# Patient Record
Sex: MALE | Race: WHITE | Employment: FULL TIME | RURAL
[De-identification: names, ages, dates, MRNs, and addresses within clinical notes are randomized per-mention and may not be internally consistent; named-entity substitution may affect disease eponyms.]

---

## 2022-11-21 ENCOUNTER — APPOINTMENT (OUTPATIENT)
Dept: GENERAL RADIOLOGY | Age: 52
End: 2022-11-21
Attending: FAMILY MEDICINE
Payer: COMMERCIAL

## 2022-11-21 ENCOUNTER — HOSPITAL ENCOUNTER (EMERGENCY)
Age: 52
Discharge: HOME | End: 2022-11-21
Attending: FAMILY MEDICINE
Payer: COMMERCIAL

## 2022-11-21 VITALS
OXYGEN SATURATION: 98 % | RESPIRATION RATE: 18 BRPM | HEIGHT: 72 IN | SYSTOLIC BLOOD PRESSURE: 128 MMHG | WEIGHT: 220 LBS | TEMPERATURE: 97.6 F | DIASTOLIC BLOOD PRESSURE: 67 MMHG | BODY MASS INDEX: 29.8 KG/M2 | HEART RATE: 70 BPM

## 2022-11-21 DIAGNOSIS — S83.402A SPRAIN OF COLLATERAL LIGAMENT OF LEFT KNEE, INITIAL ENCOUNTER: Primary | ICD-10-CM

## 2022-11-21 DIAGNOSIS — T14.8XXA ABRASION: ICD-10-CM

## 2022-11-21 PROCEDURE — L1830 KO IMMOB CANVAS LONG PRE OTS: HCPCS

## 2022-11-21 PROCEDURE — 73562 X-RAY EXAM OF KNEE 3: CPT

## 2022-11-21 PROCEDURE — 99283 EMERGENCY DEPT VISIT LOW MDM: CPT

## 2022-11-21 NOTE — Clinical Note
[address] [place] [address] EMERGENCY DEP  [address] [facility] [address] [place] [id number]  [phone]    Work/School Note    Date: 11/21/2022    To Whom It May concern:    Eulalia Kaur was seen and treated today in the emergency room by the following provider(s):  Attending Provider: Traci Calderon MD.      Eulalia Kaur is excused from work/school on 11/21/2022 through 11/23/2022. He is medically clear to return to work/school on 11/24/2022.          Sincerely,          Munir Haynes MD

## 2022-11-22 NOTE — DISCHARGE INSTRUCTIONS
--Knee brace when you are walking or standing up. Crutches as needed. --Ibuprofen 800 mg every 8 hours as needed for pain. --Ice for 20 minutes every hour while awake. --Wash the cut with soap and water, dress with Neosporin and bandage twice daily.

## 2022-11-22 NOTE — ED TRIAGE NOTES
Pt reports that he tripped and fell this morning around 0900 - c/o left knee pain that is worse with standing and ambulating. Walks with cane. No LOC/did not hit head.

## 2022-11-22 NOTE — ED PROVIDER NOTES
EMERGENCY DEPARTMENT HISTORY AND PHYSICAL EXAM          Date: 11/21/2022  Patient Name: Renato Jeronimo    History of Presenting Illness     Chief Complaint   Patient presents with    Knee Injury       History Provided By: Patient    HPI: Renato Jeronimo is a 46 y.o. male, pmhx , who was brought to the ED by his wife after he fell and injured his left knee. He was carrying a table about 10 hours ago when he fell on tile floor in his home, landing on his left knee. He is not certain if he fell straight onto his patella or if he twisted his knee as he fell. He had a large hematoma and a superficial lac to his patella that he noticed soon after he fell. He used ice and ibuprofen through the day, but the pain increased through the day. He is able to bear weight with the aid of a cane that belonged to his father. No previous injuries to his knee, and no other injury when he fell. The worst pain is just lateral to the patella, as well as on the medial aspect of the patella itself. PCP: Ally Guerrero, Not On File, MD    Allergies: NKDA  Social Hx: 1/2 ppd tobacco, -vaping, +EtOH, -Illicit Drugs; Lives locally c wife. There are no other complaints, changes, or physical findings at this time. Past History     Past Medical History:  No past medical history on file. Past Surgical History:  No past surgical history on file. Family History:  No family history on file. Social History:  Social History     Tobacco Use    Smoking status: Every Day     Packs/day: 0.50     Years: 30.00     Pack years: 15.00     Types: Cigarettes   Vaping Use    Vaping Use: Never used   Substance Use Topics    Alcohol use: Yes     Alcohol/week: 6.0 standard drinks     Types: 6 Cans of beer per week    Drug use: Yes     Types: Marijuana     Comment: occas       Allergies:  No Known Allergies      Review of Systems   Review of Systems   Musculoskeletal:  Positive for joint swelling. Negative for neck pain. Skin:  Positive for wound. Neurological:  Negative for weakness and numbness. Physical Exam   Physical Exam  Constitutional:       Appearance: Normal appearance. HENT:      Head: Normocephalic. Right Ear: External ear normal.      Left Ear: External ear normal.      Nose: Nose normal.      Mouth/Throat:      Mouth: Mucous membranes are moist.   Eyes:      Extraocular Movements: Extraocular movements intact. Pupils: Pupils are equal, round, and reactive to light. Cardiovascular:      Rate and Rhythm: Normal rate. Pulmonary:      Effort: Pulmonary effort is normal.   Musculoskeletal:      Left knee: Swelling, effusion, erythema, laceration and bony tenderness present. No deformity or ecchymosis. Decreased range of motion. Tenderness present over the lateral joint line. Normal alignment and normal patellar mobility. Legs:       Comments: --Swelling, tenderness over the left patella and lateral to the patella, including the lateral joint space. --Slightly decreased AROM.  --Superficial laceration on central patella, approx 1.5 cm in the horizontal direction. --Thigh, leg without tenderness or swelling. Skin:     General: Skin is warm and dry. Neurological:      Mental Status: He is alert and oriented to person, place, and time. Diagnostic Study Results       Radiologic Studies -   XR KNEE LT 3 V   Final Result   No acute fracture. There is a small joint effusion. Medical Decision Making   I am the first provider for this patient. I reviewed the vital signs, available nursing notes, past medical history, past surgical history, family history and social history. Vital Signs-Reviewed the patient's vital signs.   Patient Vitals for the past 12 hrs:   Temp Pulse Resp BP SpO2   11/21/22 1950 -- -- -- -- 98 %   11/21/22 1945 97.6 °F (36.4 °C) 70 18 128/67 98 %       Pulse Oximetry Analysis - 98% on RA      Records Reviewed: Nursing Notes and Old Medical Records    Provider Notes (Medical Decision Making):   MDM:  Pt with a sprain/contusion/abrasion to his knee. Advised to use crutches and knee brace while up and around. Could be a lateral collateral tear, but more likely a contusion to the patella and the left tibial plateau. ED Course:   Initial assessment performed. The patients presenting problems have been discussed, and they are in agreement with the care plan formulated and outlined with them. I have encouraged them to ask questions as they arise throughout their visit. PROGRESS NOTE:  Pt fitted with a knee immobilizer, and the wound cleaned and dressed. He reported that he had crutches at home. His work is on his feet all day, so he was given a note for 3 days. He was referred to Dr. Ann Rayo for follow up. Discharge note:  Pt re-evaluated and noted to be feeling better, ready for discharge. Updated pt on all final xray findings. Will follow up as instructed . All questions have been answered, pt voiced understanding and agreement with plan. Specific return precautions provided as well as instructions to return to the ED should sx worsen at any time. Vital signs stable for discharge. Critical Care Time: 0      Diagnosis     Clinical Impression:   1. Sprain of collateral ligament of left knee, initial encounter    2. Abrasion        PLAN:  1. There are no discharge medications for this patient.     2.   Follow-up Information       Follow up With Specialties Details Why Contact Info    Velia Paredes MD Orthopedic Surgery In 3 days  [address]  [address] [facility]  [phone]            Return to ED if worse     Disposition:  Home

## 2025-06-26 ENCOUNTER — TRANSCRIBE ORDERS (OUTPATIENT)
Facility: HOSPITAL | Age: 55
End: 2025-06-26

## 2025-06-26 DIAGNOSIS — M54.41 CHRONIC BILATERAL LOW BACK PAIN WITH RIGHT-SIDED SCIATICA: Primary | ICD-10-CM

## 2025-06-26 DIAGNOSIS — M51.26 HNP (HERNIATED NUCLEUS PULPOSUS), LUMBAR: ICD-10-CM

## 2025-06-26 DIAGNOSIS — G89.29 CHRONIC BILATERAL LOW BACK PAIN WITH RIGHT-SIDED SCIATICA: Primary | ICD-10-CM

## 2025-07-09 ENCOUNTER — HOSPITAL ENCOUNTER (OUTPATIENT)
Facility: HOSPITAL | Age: 55
Discharge: HOME | End: 2025-07-12
Payer: COMMERCIAL

## 2025-07-09 DIAGNOSIS — M51.26 HNP (HERNIATED NUCLEUS PULPOSUS), LUMBAR: ICD-10-CM

## 2025-07-09 DIAGNOSIS — G89.29 CHRONIC BILATERAL LOW BACK PAIN WITH RIGHT-SIDED SCIATICA: ICD-10-CM

## 2025-07-09 DIAGNOSIS — M54.41 CHRONIC BILATERAL LOW BACK PAIN WITH RIGHT-SIDED SCIATICA: ICD-10-CM

## 2025-07-09 PROCEDURE — 72148 MRI LUMBAR SPINE W/O DYE: CPT
